# Patient Record
Sex: MALE | Race: BLACK OR AFRICAN AMERICAN | NOT HISPANIC OR LATINO | Employment: UNEMPLOYED | URBAN - METROPOLITAN AREA
[De-identification: names, ages, dates, MRNs, and addresses within clinical notes are randomized per-mention and may not be internally consistent; named-entity substitution may affect disease eponyms.]

---

## 2020-09-16 ENCOUNTER — TELEPHONE (OUTPATIENT)
Dept: ORTHOPEDICS | Facility: CLINIC | Age: 35
End: 2020-09-16

## 2020-09-16 NOTE — TELEPHONE ENCOUNTER
Called [facility] back. Advised that Dr. Suárez is out of the office until Tuesday. Advised that he would have to approve seeing the pt, as he does not accept pts insurance. Asked that they fax over records so that Dr. Suárez can review them when he returns to office. Advised that we would call back once he has reviewed them and let them know his decision. Thanks, Ana María

## 2020-09-16 NOTE — TELEPHONE ENCOUNTER
----- Message from Mary Rock MA sent at 9/16/2020 10:30 AM CDT -----  Contact: [facility] in [place] --benito DANIELLE    Wants to schedule appt   Left femur, R femur   Call back

## 2020-09-18 ENCOUNTER — TELEPHONE (OUTPATIENT)
Dept: ORTHOPEDICS | Facility: CLINIC | Age: 35
End: 2020-09-18

## 2020-09-18 NOTE — TELEPHONE ENCOUNTER
[facility] is trying to forward over patient's records, but she has over 600 pages. She would like to know how to send the records over other than faxing them.

## 2020-09-18 NOTE — TELEPHONE ENCOUNTER
Left message requesting [facility]ie fax patient's operative report and radiology reports to [phone] for Dr. Suárez to review.

## 2020-09-18 NOTE — TELEPHONE ENCOUNTER
----- Message from Ritchie Zendejas sent at 9/18/2020 11:27 AM CDT -----  Regarding: advice  Contact: nurse  Type: Needs Medical Advice  Who Called:  [facility] - [facility]  Symptoms (please be specific):    How long has patient had these symptoms:    Pharmacy name and phone #:    Best Call Back Number: [phone]  Additional Information: The nurse requesting to speak with the doctor's nurse regarding the pt.

## 2020-09-22 ENCOUNTER — TELEPHONE (OUTPATIENT)
Dept: ORTHOPEDICS | Facility: CLINIC | Age: 35
End: 2020-09-22

## 2020-09-22 NOTE — TELEPHONE ENCOUNTER
----- Message from Neymar Garber sent at 9/22/2020  1:38 PM CDT -----  Regarding: Returning call to Geetha  Contact: [facility]ie   call

## 2020-09-22 NOTE — TELEPHONE ENCOUNTER
Returned called to [facility] with [facility]. Explained at this time medicaid slots are full. Dr. Suárez suggested referring to [place]. Thanks, Geetha

## 2021-04-09 ENCOUNTER — IMMUNIZATION (OUTPATIENT)
Dept: INTERNAL MEDICINE | Facility: CLINIC | Age: 36
End: 2021-04-09
Payer: MEDICAID

## 2021-04-09 DIAGNOSIS — Z23 NEED FOR VACCINATION: Primary | ICD-10-CM

## 2021-04-09 PROCEDURE — 91300 COVID-19, MRNA, LNP-S, PF, 30 MCG/0.3 ML DOSE VACCINE: CPT | Mod: PBBFAC

## 2021-05-01 ENCOUNTER — IMMUNIZATION (OUTPATIENT)
Dept: INTERNAL MEDICINE | Facility: CLINIC | Age: 36
End: 2021-05-01
Payer: MEDICAID

## 2021-05-01 DIAGNOSIS — Z23 NEED FOR VACCINATION: Primary | ICD-10-CM

## 2021-05-01 PROCEDURE — 91300 COVID-19, MRNA, LNP-S, PF, 30 MCG/0.3 ML DOSE VACCINE: CPT | Mod: PBBFAC

## 2021-05-01 PROCEDURE — 0002A COVID-19, MRNA, LNP-S, PF, 30 MCG/0.3 ML DOSE VACCINE: CPT | Mod: PBBFAC
